# Patient Record
Sex: MALE | Race: WHITE | Employment: STUDENT | URBAN - METROPOLITAN AREA
[De-identification: names, ages, dates, MRNs, and addresses within clinical notes are randomized per-mention and may not be internally consistent; named-entity substitution may affect disease eponyms.]

---
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
## 2022-03-19 PROBLEM — S93.401A RIGHT ANKLE SPRAIN: Status: ACTIVE | Noted: 2021-12-01

## 2022-03-20 PROBLEM — M25.571 ACUTE RIGHT ANKLE PAIN: Status: ACTIVE | Noted: 2021-12-01

## 2022-07-22 ENCOUNTER — TRANSCRIBE ORDER (OUTPATIENT)
Dept: SCHEDULING | Age: 22
End: 2022-07-22

## 2022-07-22 DIAGNOSIS — B27.90 MONONUCLEOSIS: Primary | ICD-10-CM

## 2022-07-25 ENCOUNTER — TRANSCRIBE ORDER (OUTPATIENT)
Dept: SCHEDULING | Age: 22
End: 2022-07-25

## 2022-07-25 DIAGNOSIS — B27.90 MONONUCLEOSIS: Primary | ICD-10-CM

## 2022-11-30 ENCOUNTER — OFFICE VISIT (OUTPATIENT)
Dept: ORTHOPEDIC SURGERY | Age: 22
End: 2022-11-30
Payer: COMMERCIAL

## 2022-11-30 DIAGNOSIS — M25.511 ACUTE PAIN OF RIGHT SHOULDER: Primary | ICD-10-CM

## 2022-11-30 DIAGNOSIS — M25.512 ACUTE PAIN OF LEFT SHOULDER: ICD-10-CM

## 2022-11-30 NOTE — PROGRESS NOTES
Jamey Lopez (: 2000) is a 23 y.o. male, patient, here for evaluation of the following chief complaint(s):  Shoulder Pain (Right shoulder )       HPI:    82-year-old male presents with chief complaint of bilateral shoulder pain. 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 He states that this past Saturday he landed on his right shoulder and had shoulder pain afterwards. Since that time the pain has improved. The majority of the pain is located over the lateral aspect of his shoulder but he is in no pain today. His main ongoing complaint is with his left shoulder that he has been dealing with since he was a sophomore in high school. Most of his pain is felt when he is bench pressing or when he engages in blocking during football. He played the entire season but now wants to get this checked out since it is the off season. He played with a shoulder stabilizing brace throughout the season on his left shoulder. No Known Allergies    No current outpatient medications on file. No current facility-administered medications for this visit. No past medical history on file. No past surgical history on file. No family history on file.      Social History     Socioeconomic History    Marital status: SINGLE     Spouse name: Not on file    Number of children: Not on file    Years of education: Not on file    Highest education level: Not on file   Occupational History    Not on file   Tobacco Use    Smoking status: Not on file    Smokeless tobacco: Not on file   Substance and Sexual Activity    Alcohol use: Not on file    Drug use: Not on file    Sexual activity: Not on file   Other Topics Concern    Not on file   Social History Narrative    Not on file     Social Determinants of Health     Financial Resource Strain: Not on file   Food Insecurity: Not on file   Transportation Needs: Not on file   Physical Activity: Not on file   Stress: Not on file   Social Connections: Not on file   Intimate Partner Violence: Not on file   Housing Stability: Not on file       Review of Systems   Musculoskeletal:         Right shoulder      Vitals: There were no vitals taken for this visit. There is no height or weight on file to calculate BMI. Ortho Exam       Patient is alert and oriented x3. Patient is in no acute distress. Patient ambulates with a nonantalgic gait. Right shoulder:  No shoulder girdle atrophy . There is no soft tissue swelling, ecchymosis, abrasions or lacerations. Active range of motion is full with forward flexion, lateral abduction and external rotation. Internal rotation is to the upper lumbar level with a negative lift-off sign. Passive range of motion is full with a negative impingement sign and a negative Baird sign. Rotator cuff strength with forward flexion, lateral abduction and external rotation is intact with 5/5 strength. There is no crepitation about the joint. Palpation of the [facility] joint does not reproduce discomfort, and there is no pain elicited with cross-body adduction. Strength of the extremity is 5/5 at biceps/triceps/wrist extension. DRT's are intact at +2/4 and  symmetrical.  Cervical range of motion is full with no pain to palpation along the paraspinal musculature medial border of the scapula. Spurling's sign is negative. Left shoulder:  No shoulder girdle atrophy . There is no soft tissue swelling, ecchymosis, abrasions or lacerations. Active range of motion is full with forward flexion, lateral abduction and external rotation. Internal rotation is to the upper lumbar level with a negative lift-off sign. Passive range of motion is full with a negative impingement sign and a negative Baird sign. Jerk test recreates mild pain but no crepitation and no level of instability. Rotator cuff strength with forward flexion,  abduction and external rotation is intact with 5/5 strength. There is no crepitation about the joint.   Palpation of the [facility] joint does not reproduce discomfort, and there is no pain elicited with cross-body adduction. Strength of the extremity is 5/5 at biceps/triceps/wrist extension. DRT's are intact at +2/4 and  symmetrical.  Cervical range of motion is full with no pain to palpation along the paraspinal musculature medial border of the scapula. Spurling's sign is negative. XR Results (most recent):  Results from Appointment encounter on 11/30/22    XR SHOULDER LT AP/LAT MIN 2 V    Narrative  Three-view x-ray of the left shoulder reveals no evidence of fracture dislocation. There is no bony irregularity noted. XR Results (most recent):  Results from Appointment encounter on 11/30/22    XR SHOULDER LT AP/LAT MIN 2 V    Narrative  Three-view x-ray of the left shoulder reveals no evidence of fracture dislocation. There is no bony irregularity noted. XR SHOULDER RT AP/LAT MIN 2 V    Narrative  Three-view x-ray of the right shoulder reveals no fracture dislocation no signs of bony irregularity. There is no evidence of a Hill-Sachs lesion. ASSESSMENT/PLAN:    Patient's right shoulder is pretty quiet. The left shoulder seems to be more chronic and more problematic. Both do not necessarily demonstrate a major structural problem in the shoulder that would require surgical attention. If there is any structural issue with the shoulder most likely will be a posterior labral issue. At this stage she continues to condition and does not seem to be overly bothered by the shoulder. Therefore, I think it be reasonable to simply give this more time. However, if he would consider further testing I would definitely recommend an MRI evaluation of the shoulder. Again this would provide better clarity of the potential injury. He is elected to continue with conditioning which I think is appropriate. He is to keep me posted.         Sue Perez MD

## 2022-11-30 NOTE — LETTER
11/30/2022    Patient: Viviane Johns   YOB: 2000   Date of Visit: 11/30/2022     Christina Green MD  [address] [place] [address]  [address] [facility]    Dear Christina Green MD,      Thank you for referring Mr. Graham Pagan to [facility] for evaluation. My notes for this consultation are attached. If you have questions, please do not hesitate to call me. I look forward to following your patient along with you.       Sincerely,    Екатерина Nava MD

## 2023-12-15 ENCOUNTER — HOSPITAL ENCOUNTER (OUTPATIENT)
Facility: HOSPITAL | Age: 23
Discharge: HOME | End: 2023-12-15
Attending: ORTHOPAEDIC SURGERY
Payer: COMMERCIAL

## 2023-12-15 DIAGNOSIS — S83.511S COMPLETE TEAR OF ANTERIOR CRUCIATE LIGAMENT OF RIGHT KNEE, SEQUELA: ICD-10-CM

## 2023-12-15 PROCEDURE — 73721 MRI JNT OF LWR EXTRE W/O DYE: CPT
